# Patient Record
Sex: MALE | Race: WHITE | NOT HISPANIC OR LATINO | Employment: PART TIME | URBAN - METROPOLITAN AREA
[De-identification: names, ages, dates, MRNs, and addresses within clinical notes are randomized per-mention and may not be internally consistent; named-entity substitution may affect disease eponyms.]

---

## 2023-10-10 ENCOUNTER — OFFICE VISIT (OUTPATIENT)
Dept: PRIMARY CARE | Facility: CLINIC | Age: 28
End: 2023-10-10
Payer: COMMERCIAL

## 2023-10-10 VITALS
BODY MASS INDEX: 38.74 KG/M2 | HEART RATE: 94 BPM | TEMPERATURE: 97.8 F | HEIGHT: 72 IN | OXYGEN SATURATION: 98 % | SYSTOLIC BLOOD PRESSURE: 120 MMHG | WEIGHT: 286 LBS | DIASTOLIC BLOOD PRESSURE: 78 MMHG

## 2023-10-10 DIAGNOSIS — D49.2 SKIN NEOPLASM: Primary | ICD-10-CM

## 2023-10-10 PROCEDURE — 99203 OFFICE O/P NEW LOW 30 MIN: CPT | Performed by: PHYSICIAN ASSISTANT

## 2023-10-10 PROCEDURE — 1036F TOBACCO NON-USER: CPT | Performed by: PHYSICIAN ASSISTANT

## 2023-10-10 RX ORDER — PROGESTERONE 100 MG/1
100 CAPSULE ORAL EVERY EVENING
COMMUNITY
Start: 2023-09-30

## 2023-10-10 RX ORDER — ESTRADIOL 2 MG/1
TABLET ORAL
COMMUNITY
Start: 2023-08-28

## 2023-10-10 ASSESSMENT — ENCOUNTER SYMPTOMS
ABDOMINAL PAIN: 0
SHORTNESS OF BREATH: 0
PALPITATIONS: 0

## 2023-10-10 NOTE — PROGRESS NOTES
Subjective   Patient ID: Qasim Whyte is a 28 y.o. male who presents for New Patient Visit and Suspicious Skin Lesion (On L side of upper arm x 2 years.).    HPI   Patient complains of skin lesion on left upper arm that he noticed 1-2 years ago but it is getting a little bigger and changing color the past few months so want to see dermatologist. Tried to schedule with one but was told he needs a referral with his insurance. Is concerned since he thinks that his father and MGF had skin cancer spots removed, plus he has very fair skin.  Plans to have entire skin check there.     Was last here 2/2020.  No significant changes in health.   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     reports that he has never smoked. He has never used smokeless tobacco.    Review of Systems   Respiratory:  Negative for shortness of breath.    Cardiovascular:  Negative for chest pain and palpitations.   Gastrointestinal:  Negative for abdominal pain.     History reviewed. No pertinent past medical history.   No family history on file.   Social History     Tobacco Use    Smoking status: Never    Smokeless tobacco: Never   Vaping Use    Vaping Use: Never used   Substance Use Topics    Alcohol use: Never    Drug use: Never        Objective   /78   Pulse 94   Temp 36.6 °C (97.8 °F)   Ht 1.829 m (6')   Wt 130 kg (286 lb)   SpO2 98%   BMI 38.79 kg/m²     Physical Exam  Vitals and nursing note reviewed.   HENT:      Head: Normocephalic.   Eyes:      General: No scleral icterus.  Cardiovascular:      Rate and Rhythm: Normal rate and regular rhythm.   Pulmonary:      Effort: Pulmonary effort is normal.      Breath sounds: Normal breath sounds.   Skin:     General: Skin is warm and dry.      Comments: Has 5-6mm raised skin lesion with irregular pigmentation on left triceps area. Also has a brown nevus on right side of neck.    Neurological:      Mental Status: He is alert.   Psychiatric:         Mood and Affect: Affect normal.          Assessment/Plan   Diagnoses and all orders for this visit:  Skin neoplasm  -     Referral to Dermatology; Future       Referred to dermatologist due to skin lesion changes.   Discussed diet and exercise, and encouraged wt loss.   Follow up prn.

## 2023-12-22 ENCOUNTER — APPOINTMENT (OUTPATIENT)
Dept: DERMATOLOGY | Facility: CLINIC | Age: 28
End: 2023-12-22
Payer: COMMERCIAL